# Patient Record
Sex: FEMALE | Race: WHITE | NOT HISPANIC OR LATINO | URBAN - METROPOLITAN AREA
[De-identification: names, ages, dates, MRNs, and addresses within clinical notes are randomized per-mention and may not be internally consistent; named-entity substitution may affect disease eponyms.]

---

## 2022-01-12 ENCOUNTER — APPOINTMENT (OUTPATIENT)
Dept: URBAN - METROPOLITAN AREA SURGERY 18 | Age: 82
Setting detail: DERMATOLOGY
End: 2022-01-19

## 2022-01-12 VITALS — HEART RATE: 70 BPM | DIASTOLIC BLOOD PRESSURE: 74 MMHG | SYSTOLIC BLOOD PRESSURE: 133 MMHG

## 2022-01-12 PROBLEM — C44.311 BASAL CELL CARCINOMA OF SKIN OF NOSE: Status: ACTIVE | Noted: 2022-01-12

## 2022-01-12 PROCEDURE — OTHER REFERRAL CORRESPONDENCE: OTHER

## 2022-01-12 PROCEDURE — OTHER COUNSELING: OTHER

## 2022-01-12 PROCEDURE — OTHER CONSULTATION FOR MOHS SURGERY: OTHER

## 2022-01-12 PROCEDURE — 99203 OFFICE O/P NEW LOW 30 MIN: CPT

## 2022-01-12 PROCEDURE — OTHER PATIENT SPECIFIC COUNSELING: OTHER

## 2022-01-12 NOTE — HPI: MOHS SURGERY CONSULTATION
Has The Cancer Been Biopsied Before?: has been previously biopsied
Who Is Your Referring Provider?: Spike Coy PA-C
When Was Your Biopsy?: 05/07/2021

## 2022-01-12 NOTE — PROCEDURE: CONSULTATION FOR MOHS SURGERY
Location Indication Override (Is Already Calculated Based On Selected Body Location): Area H
Incorporate Mauc In Note: Yes
Name Of The Referring Provider For Procedure: Spike Coy PA-C
Other Plan: patient will go home a decide on her options which includes Mohs surgery with graft or adjacent tissue replacement, radiation, or declining treatment all together. patient will call back with her decision.
X Size Of Lesion In Cm (Optional): 0
Detail Level: Detailed

## 2022-01-12 NOTE — PROCEDURE: PATIENT SPECIFIC COUNSELING
Detail Level: Zone
* Extended counseling with patient (and her son who was present) regarding the diagnosis/prognosis and options for management. We specifically reviewed the likely large size and potential heminasal full thickness defect that could result from surgery (or extension deeper into the nasal airway). We discussed the extensive surgery and reconstruction that may be required in detail and showed the patient and her son photographs of the flaps/grafts that may be used in reconstruction of the postoperative defect. We reviewed interpolation flaps, specifically a forehead flap along with cartilage grafting and flaps/graft for nasal lining in detail. The patient's support system and need for additional assistance during the postoperative period was reviewed. She currently lives alone in an independent living facility. We reviewed the need for an additional person to be with her during the immediate postoperative period both to assist with ambulation (to ensure she does not fall) and basic daily activities and to monitor her for acute complications that may necessitate urgent evaluation. We also discussed her ability to wear glasses during the first 3 weeks may be compromised (some glasses may be modified to fit over the large bandages). \\n\\n* We discussed the outcomes following a decision to observe the tumor (no treatment). We discussed the uncertainty regarding when the tumor would progress or begin to interfere with basic activity (or begin to interfere with normal function of the right nose/right nasal airway). She understands tumor progression could lead to consistent bleeding, erosion/ulceration of nasal tissue (that may become a full thickness defect - i.e an open hole), or ultimately collapse portions of the nasal support (ala/alar rim/nasal cartilages) that compromise airflow through the right nostril. We cannot say when or if this will occur during her lifetime. We pointed out the clinically evident alar rim notching on affected side of the nose compared with the normal left ala. \\n\\n* We discussed alternative treatment options in detail - their respective risks/benefits/all possible outcomes. We also reviewed the differences in definitive and palliative treatment options (with respect to each treatment option) - the risks/benefits/potential outcomes of the aforementioned treatment options if pursued as definitive treatment/palliative treatment. We reviewed palliative procedures (i.e palliative radiation) or palliative surgery (i.e tumor debulking) and the outcomes that may result from partial tumor treatment (inflammation leading to tumor erosion and loss of structural support, poor healing or nonhealing ulceration, etc). \\n\\n* The patient and her son were given time to ask all questions about the diagnosis/prognosis/treatment plan. The patient and her son verbalized understanding of the contents of our discussion in full and stated all questions and concerns were addressed during the visit. They will be given time to consider options for treatment and understand they can call and any time to address additional questions/concerns or to revisit any portion of the conversation. They understand we have recommended consultation with radiation/oncology to further discuss the risks/benefits/outcomes of radiation as a treatment option (either definitive or palliative). The consultation could assist the patient in her final choice regarding treatment of the tumor. Should they decide to proceed with consultation they know we are happy to place the referral. \\n\\nThey also verbalized understanding of the aid/assistance/support the patient will require for at least 3 weeks. They understand the patient will need assistance with most activities and should have someone present (staying with her) during the postoperative period (at minimum one week, potentially 3 weeks or longer). They understand the risks of leaving the patient alone following large surgery/reconstruction and with an obstructive bandage that may limit visual fields or prevent her from wearing her glasses. They understand that elderly patients may become disoriented or have other mobility/balance issues that could lead to a fall (which can have serious/life threatening consequences). These risks are further elevated with postoperative medications that may be prescribed in the postoperative period. \\n\\Tavares this time they will consider the options for management and will call our office once they have reached a decision on how they would like to proceed. Should we not here from the patient in one to two weeks we will call her for an update. The patient understands we will also be in close communication with her referring dermatologist regarding consultation/management of the tumor.

## 2022-03-07 ENCOUNTER — APPOINTMENT (OUTPATIENT)
Dept: URBAN - METROPOLITAN AREA SURGERY 18 | Age: 82
Setting detail: DERMATOLOGY
End: 2022-03-09

## 2022-03-07 VITALS — SYSTOLIC BLOOD PRESSURE: 137 MMHG | DIASTOLIC BLOOD PRESSURE: 64 MMHG | HEART RATE: 64 BPM

## 2022-03-07 PROBLEM — C44.311 BASAL CELL CARCINOMA OF SKIN OF NOSE: Status: ACTIVE | Noted: 2022-03-07

## 2022-03-07 PROCEDURE — 17312 MOHS ADDL STAGE: CPT

## 2022-03-07 PROCEDURE — OTHER MOHS SURGERY: OTHER

## 2022-03-07 PROCEDURE — OTHER REFERRAL CORRESPONDENCE: OTHER

## 2022-03-07 PROCEDURE — 17311 MOHS 1 STAGE H/N/HF/G: CPT

## 2022-03-07 PROCEDURE — OTHER PRESCRIPTION: OTHER

## 2022-03-07 PROCEDURE — 21235 EAR CARTILAGE GRAFT: CPT

## 2022-03-07 PROCEDURE — 15260 FTH/GFT FR N/E/E/L 20 SQCM/<: CPT

## 2022-03-07 PROCEDURE — OTHER COUNSELING: OTHER

## 2022-03-07 PROCEDURE — OTHER PATIENT SPECIFIC COUNSELING: OTHER

## 2022-03-07 RX ORDER — OXYCODONE HYDROCHLORIDE AND ACETAMINOPHEN 5; 325 MG/1; MG/1
TABLET ORAL
Qty: 4 | Refills: 0 | Status: CANCELLED

## 2022-03-07 RX ORDER — CEPHALEXIN 500 MG/1
TABLET ORAL
Qty: 28 | Refills: 3 | Status: ERX | COMMUNITY
Start: 2022-03-07

## 2022-03-07 NOTE — PROCEDURE: MOHS SURGERY
Critical Care Closure 2 Information: This tab is for additional flaps and grafts, including complex repair and grafts and complex repair and flaps. You can also specify a different location for the additional defect, if the location is the same you do not need to select a new one. We will insert the automated text for the repair you select below just as we do for solitary flaps and grafts. Please note that at this time if you select a location with a different insurance zone you will need to override the ICD10 and CPT if appropriate.

## 2022-03-07 NOTE — PROCEDURE: MOHS SURGERY
Bed: RT8  Expected date:   Expected time:   Means of arrival:   Comments:  RTU ready   Referred To Oculoplastics For Closure Text (Leave Blank If You Do Not Want): After obtaining clear surgical margins the patient was sent to oculoplastics for surgical repair.  The patient understands they will receive post-surgical care and follow-up from the oculoplastic surgeon's and referring physician's office, and may follow up in our surgical clinic as needed.

## 2022-03-07 NOTE — PROCEDURE: PATIENT SPECIFIC COUNSELING
Detail Level: Zone
* Extensive review of prior preoperative consultation regarding the diagnosis/prognosis, treatment options (their risks, benefits, all potential outcomes and alternatives) and the potential extensive nature of the surgery/reconstruction that is anticipated. Patient and her son who was present verbalized understanding. They have arranged for him to stay with her for at least the first week postoperatively. All questions and concerns addressed prior to the start of the procedure.

## 2022-03-08 ENCOUNTER — APPOINTMENT (OUTPATIENT)
Dept: URBAN - METROPOLITAN AREA SURGERY 18 | Age: 82
Setting detail: DERMATOLOGY
End: 2022-03-10

## 2022-03-08 PROBLEM — C44.311 BASAL CELL CARCINOMA OF SKIN OF NOSE: Status: ACTIVE | Noted: 2022-03-08

## 2022-03-08 PROCEDURE — OTHER PRESCRIPTION: OTHER

## 2022-03-08 PROCEDURE — 15731 FOREHEAD FLAP W/VASC PEDICLE: CPT | Mod: 79

## 2022-03-08 PROCEDURE — OTHER REPAIR NOTE: OTHER

## 2022-03-08 RX ORDER — OXYCODONE HYDROCHLORIDE AND ACETAMINOPHEN 5; 325 MG/1; MG/1
TABLET ORAL
Qty: 10 | Refills: 0 | Status: ERX | COMMUNITY
Start: 2022-03-08

## 2022-03-08 NOTE — PROCEDURE: REPAIR NOTE
Dry Eye Syndrome Counseling: I have discussed the diagnosis and the pathophysiology of this disease with the patient. Eyelid pathology and systemic illnesses such as Sjogren?s disease or rheumatoid arthritis may contribute to severity. Vision may be limited by dry eye, and symptoms exacerbated by environmental factors such as smoke, wind, or prolonged eye use. Treatment options include, but are not limited to, artificial tears, punctal plugs, topical cyclosporine, oral omega-3 supplements, Lipiflow, moisture goggles, and lubricating ointments. I stressed the importance of compliance with treatment. Melolabial Interpolation Flap Text: A decision was made to reconstruct the defect utilizing an interpolation axial flap and a staged reconstruction.  A telfa template was made of the defect.  This telfa template was then used to outline the melolabial interpolation flap.  The donor area for the pedicle flap was then injected with anesthesia.  The flap was excised through the skin and subcutaneous tissue down to the layer of the underlying musculature.  The pedicle flap was carefully excised within this deep plane to maintain its blood supply.  The edges of the donor site were undermined.   The donor site was closed in a primary fashion.  The pedicle was then rotated into position and sutured.  Once the tube was sutured into place, adequate blood supply was confirmed with blanching and refill.  The pedicle was then wrapped with xeroform gauze and dressed appropriately with a telfa and gauze bandage to ensure continued blood supply and protect the attached pedicle.

## 2022-03-08 NOTE — PROCEDURE: REPAIR NOTE
Consent: The rationale for the repair was explained to the patient and consent was obtained. The risks, benefits and alternatives to therapy were discussed in detail. Specifically, the risks of infection/abscess, scarring, bleeding, prolonged wound healing, incomplete removal, allergy to anesthesia, nerve injury and dehiscence were addressed. We reviewed the procedure specific (interpolated flaps) and anatomic specific (large and full thickness nasal defects) risks prior to the procedure. Prior to the procedure, the treatment site was clearly identified and confirmed by the patient. All components of Universal Protocol/PAUSE Rule completed.

## 2022-03-09 ENCOUNTER — APPOINTMENT (OUTPATIENT)
Dept: URBAN - METROPOLITAN AREA SURGERY 18 | Age: 82
Setting detail: DERMATOLOGY
End: 2022-03-10

## 2022-03-09 DIAGNOSIS — Z48.817 ENCOUNTER FOR SURGICAL AFTERCARE FOLLOWING SURGERY ON THE SKIN AND SUBCUTANEOUS TISSUE: ICD-10-CM

## 2022-03-09 PROCEDURE — 99024 POSTOP FOLLOW-UP VISIT: CPT

## 2022-03-09 PROCEDURE — OTHER POST-OP WOUND CHECK: OTHER

## 2022-03-09 ASSESSMENT — LOCATION DETAILED DESCRIPTION DERM: LOCATION DETAILED: RIGHT NASAL SIDEWALL

## 2022-03-09 ASSESSMENT — LOCATION ZONE DERM: LOCATION ZONE: NOSE

## 2022-03-09 ASSESSMENT — LOCATION SIMPLE DESCRIPTION DERM: LOCATION SIMPLE: RIGHT NOSE

## 2022-03-09 NOTE — PROCEDURE: POST-OP WOUND CHECK
Detail Level: Detailed
Add 85028 Cpt? (Important Note: In 2017 The Use Of 65411 Is Being Tracked By Cms To Determine Future Global Period Reimbursement For Global Periods): yes
Additional Comments: Patient presents 1 day post forehead to nose flap reconstruction. The patient states she did well overnight with no bleeding, pain or other complications. On examination, the flap is well approximated and well perfused with brisk capillary refill. No signs/symptoms of impending flap necrosis or ulceration. There is minimal bleeding (\"spotting\") at the right lateral edge of the flap and focally along lateral aspects of pedicle. The surgical site was cleansed and cauterized minimally where needed.  The patient was rebandaged with xeroform, telfa, petroleum, gauze, and tape. A roll of xeroform was placed in her right nare. The patient was instructed to remove it tomorrow morning. The patient was educated to avoid placing any pressure on the pedicle of the flap “bridge of nose”. The bandage will remain in place until the patient returns next week. We reviewed events that should prompt call and return to clinic sooner than scheduled, and the patient and her son know if they have any issues overnight or concerns they should call and return to clinic on Thursday prior to the weekend. They understand how to contact the office after hours and that should anything come up over the weekend they should use the after hours office number or travel to nearest ER or urgent care.
Wound Evaluated By: Dr. Ricco Ivory

## 2022-03-14 ENCOUNTER — APPOINTMENT (OUTPATIENT)
Dept: URBAN - METROPOLITAN AREA SURGERY 18 | Age: 82
Setting detail: DERMATOLOGY
End: 2022-03-17

## 2022-03-14 DIAGNOSIS — Z48.817 ENCOUNTER FOR SURGICAL AFTERCARE FOLLOWING SURGERY ON THE SKIN AND SUBCUTANEOUS TISSUE: ICD-10-CM

## 2022-03-14 PROCEDURE — 99024 POSTOP FOLLOW-UP VISIT: CPT

## 2022-03-14 PROCEDURE — OTHER POST-OP WOUND CHECK: OTHER

## 2022-03-14 ASSESSMENT — LOCATION SIMPLE DESCRIPTION DERM: LOCATION SIMPLE: RIGHT NOSE

## 2022-03-14 ASSESSMENT — LOCATION DETAILED DESCRIPTION DERM: LOCATION DETAILED: RIGHT NASAL SIDEWALL

## 2022-03-14 ASSESSMENT — LOCATION ZONE DERM: LOCATION ZONE: NOSE

## 2022-03-14 NOTE — PROCEDURE: POST-OP WOUND CHECK
Detail Level: Detailed
Add 10861 Cpt? (Important Note: In 2017 The Use Of 45643 Is Being Tracked By Cms To Determine Future Global Period Reimbursement For Global Periods): yes
Additional Comments: Patient presents 6 days post forehead flap reconstruction. The flap and pedicle are very well perfused, pink, and display brisk capillary refill. There are no signs of impending flap necrosis or ulceration. The patient expresses minimal discomfort primarily from her ear where the cartilage graft was taken and overall has done very well postoperatively. The site was gently cleansed and superficial crusting was removed with normal saline.  The patient was instructed to continue to limit activity and avoid placing any pressure on the flap, especially area overlying pedicle. The new bandage will remain in place for 1 week when the patient will return for bandage change. Flap takedown is scheduled March 29 @1400.
Wound Evaluated By: Dr. Ricco Ivory

## 2022-03-24 ENCOUNTER — APPOINTMENT (OUTPATIENT)
Dept: URBAN - METROPOLITAN AREA SURGERY 18 | Age: 82
Setting detail: DERMATOLOGY
End: 2022-03-26

## 2022-03-24 DIAGNOSIS — Z48.817 ENCOUNTER FOR SURGICAL AFTERCARE FOLLOWING SURGERY ON THE SKIN AND SUBCUTANEOUS TISSUE: ICD-10-CM

## 2022-03-24 PROCEDURE — 99024 POSTOP FOLLOW-UP VISIT: CPT

## 2022-03-24 PROCEDURE — OTHER POST-OP WOUND CHECK: OTHER

## 2022-03-24 ASSESSMENT — LOCATION DETAILED DESCRIPTION DERM: LOCATION DETAILED: RIGHT NASAL SIDEWALL

## 2022-03-24 ASSESSMENT — LOCATION SIMPLE DESCRIPTION DERM: LOCATION SIMPLE: RIGHT NOSE

## 2022-03-24 ASSESSMENT — LOCATION ZONE DERM: LOCATION ZONE: NOSE

## 2022-03-24 NOTE — PROCEDURE: POST-OP WOUND CHECK
Add 20880 Cpt? (Important Note: In 2017 The Use Of 95982 Is Being Tracked By Cms To Determine Future Global Period Reimbursement For Global Periods): yes
Wound Evaluated By: Dr. Ricco Ivory
Detail Level: Detailed
Additional Comments: Patient presents 2 weeks and 2 days post forehead flap reconstruction. The flap is well perfused with no signs/symptoms of impending necrosis/ulceration, infection or complication. The suture lines are all well approximated. The patient reports no discomfort. Patient will leave the new bandage intact until her flap takedown appointment next Tuesday March 29 at 2pm. She was reminded to avoid placing any pressure on the curve of the flap “bridge of her nose.”

## 2022-03-29 ENCOUNTER — APPOINTMENT (OUTPATIENT)
Dept: URBAN - METROPOLITAN AREA SURGERY 18 | Age: 82
Setting detail: DERMATOLOGY
End: 2022-03-30

## 2022-03-29 PROBLEM — C44.311 BASAL CELL CARCINOMA OF SKIN OF NOSE: Status: ACTIVE | Noted: 2022-03-29

## 2022-03-29 PROCEDURE — OTHER REPAIR NOTE: OTHER

## 2022-03-29 PROCEDURE — OTHER ADDITIONAL NOTES: OTHER

## 2022-03-29 PROCEDURE — OTHER REFERRAL CORRESPONDENCE: OTHER

## 2022-03-29 PROCEDURE — 15630 DELAY FLAP EYE/NOS/EAR/LIP: CPT | Mod: 58

## 2022-03-29 NOTE — PROCEDURE: REPAIR NOTE
Consent: The rationale for the repair was explained to the patient and consent was obtained. The risks, benefits and alternatives to therapy were discussed in detail. Specifically, the risks of infection/abscess, scarring, bleeding, prolonged wound healing, incomplete removal, allergy to anesthesia, nerve injury and dehiscence were addressed. Prior to the procedure, the treatment site was clearly identified and confirmed by the patient. All components of Universal Protocol/PAUSE Rule completed.

## 2022-03-29 NOTE — PROCEDURE: ADDITIONAL NOTES
Additional Notes: * Reviewed additional procedures that are planned to recontour/reestablish normal cosmetic subunits (i.e restoring alar crease, thinning alar rim/nasal portion of flap, etc), unless the patient prefers no further intervention.
Render Risk Assessment In Note?: no
Detail Level: Simple

## 2022-03-29 NOTE — PROCEDURE: REPAIR NOTE
[FreeTextEntry1] : doing well with nursing,  gained very well\par \par cord with small granuloma, circ healing\par \par will care for cord and ret in 1 wk for another cautery if needed, otherwise at 1 month Cheek Interpolation Flap Division And Inset Text: Division and inset of the cheek interpolation flap was performed to achieve optimal aesthetic result, restore normal anatomic appearance and avoid distortion of normal anatomy, expedite and facilitate wound healing, achieve optimal functional result and because linear closure either not possible or would produce suboptimal result. The patient was prepped and draped in the usual manner. The pedicle was infiltrated with local anesthesia. The pedicle was sectioned with a #15 blade. The pedicle was de-bulked and trimmed to match the shape of the defect. Hemostasis was achieved. The flap donor site and free margin of the flap were secured with deep buried sutures and the wound edges were re-approximated.

## 2022-04-05 ENCOUNTER — APPOINTMENT (OUTPATIENT)
Dept: URBAN - METROPOLITAN AREA SURGERY 18 | Age: 82
Setting detail: DERMATOLOGY
End: 2022-04-06

## 2022-04-05 DIAGNOSIS — Z48.817 ENCOUNTER FOR SURGICAL AFTERCARE FOLLOWING SURGERY ON THE SKIN AND SUBCUTANEOUS TISSUE: ICD-10-CM

## 2022-04-05 PROCEDURE — 99024 POSTOP FOLLOW-UP VISIT: CPT

## 2022-04-05 PROCEDURE — OTHER POST-OP WOUND CHECK: OTHER

## 2022-04-05 ASSESSMENT — LOCATION SIMPLE DESCRIPTION DERM: LOCATION SIMPLE: RIGHT NOSE

## 2022-04-05 ASSESSMENT — LOCATION ZONE DERM: LOCATION ZONE: NOSE

## 2022-04-05 ASSESSMENT — LOCATION DETAILED DESCRIPTION DERM: LOCATION DETAILED: RIGHT NASAL ALA

## 2022-04-05 NOTE — PROCEDURE: POST-OP WOUND CHECK
Add 75598 Cpt? (Important Note: In 2017 The Use Of 76615 Is Being Tracked By Cms To Determine Future Global Period Reimbursement For Global Periods): yes
Additional Comments: Patient presents for one week wound check following forehead flap takedown. Site appears to be healing within normal limits, sutures well approximated, minimal crusting, no signs/symptoms of infection or poor wound healing and well vascularized. Area of second intent has granulation tissue throughout. Patient denies any complaints. Site cleaned with surgical soap and dressed with Vaseline, Telfa Pad, and Hypafix tape. Reviewed with patient continued wound care instructions, signs/symptoms of infection and to return to clinic if issues arise. Patient to follow up in one week.
Wound Evaluated By: Dr. Ricco Ivory
Detail Level: Detailed

## 2022-04-12 ENCOUNTER — APPOINTMENT (OUTPATIENT)
Dept: URBAN - METROPOLITAN AREA SURGERY 18 | Age: 82
Setting detail: DERMATOLOGY
End: 2022-04-18

## 2022-04-12 DIAGNOSIS — Z48.817 ENCOUNTER FOR SURGICAL AFTERCARE FOLLOWING SURGERY ON THE SKIN AND SUBCUTANEOUS TISSUE: ICD-10-CM

## 2022-04-12 PROCEDURE — OTHER POST-OP WOUND CHECK: OTHER

## 2022-04-12 PROCEDURE — 99024 POSTOP FOLLOW-UP VISIT: CPT

## 2022-04-12 ASSESSMENT — LOCATION SIMPLE DESCRIPTION DERM: LOCATION SIMPLE: RIGHT NOSE

## 2022-04-12 ASSESSMENT — LOCATION DETAILED DESCRIPTION DERM: LOCATION DETAILED: RIGHT NASAL ALA

## 2022-04-12 ASSESSMENT — LOCATION ZONE DERM: LOCATION ZONE: NOSE

## 2022-04-12 NOTE — PROCEDURE: POST-OP WOUND CHECK
Detail Level: Detailed
Add 65805 Cpt? (Important Note: In 2017 The Use Of 85414 Is Being Tracked By Cms To Determine Future Global Period Reimbursement For Global Periods): yes
Additional Comments: Patient presents for a 2 week post Mohs flap takedown wound check. Surgical site is slightly crusted but healing appropriately. Wound was cleaned with hibiclens and rinsed with sterile saline. Applied Vaseline, telfa, and hypafix tape to forehead and left nasal ala. Advised patient to continue daily cleaning and dressing changes until follow-up in 4 weeks.
Wound Evaluated By: Dr. Ricco Ivory

## 2022-05-10 ENCOUNTER — APPOINTMENT (OUTPATIENT)
Dept: URBAN - METROPOLITAN AREA SURGERY 18 | Age: 82
Setting detail: DERMATOLOGY
End: 2022-05-13

## 2022-05-10 DIAGNOSIS — Z48.817 ENCOUNTER FOR SURGICAL AFTERCARE FOLLOWING SURGERY ON THE SKIN AND SUBCUTANEOUS TISSUE: ICD-10-CM

## 2022-05-10 PROCEDURE — 99024 POSTOP FOLLOW-UP VISIT: CPT

## 2022-05-10 PROCEDURE — OTHER POST-OP WOUND CHECK: OTHER

## 2022-05-10 ASSESSMENT — LOCATION SIMPLE DESCRIPTION DERM: LOCATION SIMPLE: RIGHT NOSE

## 2022-05-10 ASSESSMENT — LOCATION ZONE DERM: LOCATION ZONE: NOSE

## 2022-05-10 ASSESSMENT — LOCATION DETAILED DESCRIPTION DERM: LOCATION DETAILED: RIGHT NASAL ALA

## 2022-05-10 NOTE — PROCEDURE: POST-OP WOUND CHECK
Add 59871 Cpt? (Important Note: In 2017 The Use Of 29894 Is Being Tracked By Cms To Determine Future Global Period Reimbursement For Global Periods): yes
Additional Comments: Patient presents for 5 week wound check post flap takedown. Forehead is appropriately granulated, flap site is well vascularized and well contoured. Patient no longer needs a bandage just remembering to protect this area from sunlight and daily sunscreen use while outside. Small amount of Vaseline to the tip of the nose, patient to follow up in 6 weeks.
Detail Level: Detailed
Wound Evaluated By: Dr. Ricco Ivory

## 2022-05-26 NOTE — PROCEDURE: REPAIR NOTE
Hemigard Postcare Instructions: The HEMIGARD strips are to remain completely dry for at least 5-7 days. Cyclosporine Counseling:  I discussed with the patient the risks of cyclosporine including but not limited to hypertension, gingival hyperplasia,myelosuppression, immunosuppression, liver damage, kidney damage, neurotoxicity, lymphoma, and serious infections. The patient understands that monitoring is required including baseline blood pressure, CBC, CMP, lipid panel and uric acid, and then 1-2 times monthly CMP and blood pressure.

## 2022-06-21 ENCOUNTER — APPOINTMENT (OUTPATIENT)
Dept: URBAN - METROPOLITAN AREA SURGERY 18 | Age: 82
Setting detail: DERMATOLOGY
End: 2022-06-25

## 2022-06-21 DIAGNOSIS — Z48.817 ENCOUNTER FOR SURGICAL AFTERCARE FOLLOWING SURGERY ON THE SKIN AND SUBCUTANEOUS TISSUE: ICD-10-CM

## 2022-06-21 PROCEDURE — OTHER POST-OP WOUND CHECK: OTHER

## 2022-06-21 PROCEDURE — 99024 POSTOP FOLLOW-UP VISIT: CPT

## 2022-06-21 ASSESSMENT — LOCATION DETAILED DESCRIPTION DERM: LOCATION DETAILED: RIGHT NASAL SIDEWALL

## 2022-06-21 ASSESSMENT — LOCATION SIMPLE DESCRIPTION DERM: LOCATION SIMPLE: RIGHT NOSE

## 2022-06-21 ASSESSMENT — LOCATION ZONE DERM: LOCATION ZONE: NOSE

## 2022-06-21 NOTE — PROCEDURE: POST-OP WOUND CHECK
Detail Level: Detailed
Additional Comments: Patient presents for 15 week wound check. Flap well healed, well contoured. Reviewed plans for revision/recontouring, patient prefers observation over additional surgery at this time. Patient reminded importance of sunscreen as well as sun protection to this area. Follow up in 6 weeks.
Wound Evaluated By: Dr. Ricco Ivory
Add 28044 Cpt? (Important Note: In 2017 The Use Of 08677 Is Being Tracked By Cms To Determine Future Global Period Reimbursement For Global Periods): yes

## 2022-08-02 ENCOUNTER — APPOINTMENT (OUTPATIENT)
Dept: URBAN - METROPOLITAN AREA SURGERY 18 | Age: 82
Setting detail: DERMATOLOGY
End: 2022-08-04

## 2022-08-02 DIAGNOSIS — Z48.817 ENCOUNTER FOR SURGICAL AFTERCARE FOLLOWING SURGERY ON THE SKIN AND SUBCUTANEOUS TISSUE: ICD-10-CM

## 2022-08-02 PROCEDURE — OTHER POST-OP WOUND CHECK: OTHER

## 2022-08-02 PROCEDURE — 99024 POSTOP FOLLOW-UP VISIT: CPT

## 2022-08-02 ASSESSMENT — LOCATION SIMPLE DESCRIPTION DERM: LOCATION SIMPLE: RIGHT NOSE

## 2022-08-02 ASSESSMENT — LOCATION ZONE DERM: LOCATION ZONE: NOSE

## 2022-08-02 ASSESSMENT — LOCATION DETAILED DESCRIPTION DERM: LOCATION DETAILED: RIGHT NASAL ALA

## 2022-08-02 NOTE — PROCEDURE: POST-OP WOUND CHECK
Additional Comments: Patient presents for 4 month wound check following Interpolation Flap Takedown. Flap site appears to be healing within normal limits, well approximated, well contoured, without signs/symptoms of infection or poor wound healing. Patient denies any complaints or concerns.  Patient had questions regarding redness outside area of flap/ surgical site, encouraged patient to reach out to her dermatologist for new/changing lesions and routine skin surveillance. Discussed option for additional intervention (i.e flap thinning, recreation of alar crease, etc) in the future should patient want to consider it. Reviewed expected appearance as scar line matures over coming weeks to months reviewed in detail. Continue massage and to wear sunscreen or keep covered while out in the sun. Patient to follow up as needed.
Wound Evaluated By: Dr. Ricco Ivory
Detail Level: Detailed
Add 29883 Cpt? (Important Note: In 2017 The Use Of 10659 Is Being Tracked By Cms To Determine Future Global Period Reimbursement For Global Periods): yes

## 2022-11-28 NOTE — PROCEDURE: REPAIR NOTE
14-Jan-2020 Methotrexate Counseling:  Patient counseled regarding adverse effects of methotrexate including but not limited to nausea, vomiting, abnormalities in liver function tests. Patients may develop mouth sores, rash, diarrhea, and abnormalities in blood counts. The patient understands that monitoring is required including LFT's and blood counts.  There is a rare possibility of scarring of the liver and lung problems that can occur when taking methotrexate. Persistent nausea, loss of appetite, pale stools, dark urine, cough, and shortness of breath should be reported immediately. Patient advised to discontinue methotrexate treatment at least three months before attempting to become pregnant.  I discussed the need for folate supplements while taking methotrexate.  These supplements can decrease side effects during methotrexate treatment. The patient verbalized understanding of the proper use and possible adverse effects of methotrexate.  All of the patient's questions and concerns were addressed. Suturegard Intro: Intraoperative tissue expansion was performed, utilizing the SUTUREGARD device, in order to reduce wound tension.